# Patient Record
Sex: MALE | Race: WHITE
[De-identification: names, ages, dates, MRNs, and addresses within clinical notes are randomized per-mention and may not be internally consistent; named-entity substitution may affect disease eponyms.]

---

## 2018-09-12 ENCOUNTER — HOSPITAL ENCOUNTER (EMERGENCY)
Dept: HOSPITAL 74 - JERFT | Age: 43
Discharge: HOME | End: 2018-09-12
Payer: COMMERCIAL

## 2018-09-12 VITALS — DIASTOLIC BLOOD PRESSURE: 55 MMHG | HEART RATE: 81 BPM | TEMPERATURE: 99.6 F | SYSTOLIC BLOOD PRESSURE: 117 MMHG

## 2018-09-12 VITALS — BODY MASS INDEX: 26.6 KG/M2
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
## 2018-09-12 DIAGNOSIS — S90.111A: Primary | ICD-10-CM
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
## 2018-09-12 NOTE — PDOC
History of Present Illness





- General


Chief Complaint: Pain


Stated Complaint: RT TOE PAIN


Time Seen by Provider: 09/12/18 16:43





- History of Present Illness


Initial Comments: 





09/12/18 17:08


42-year-old male without comorbidities presents for evaluation of right great 

toe pain after banging into the floor while walking today. He points to the IPJ 

of the right great toe at the area of his discomfort.





Past History





- Past Medical History


Allergies/Adverse Reactions: 


 Allergies











Allergy/AdvReac Type Severity Reaction Status Date / Time


 


No Known Allergies Allergy   Verified 09/12/18 16:35











Home Medications: 


Ambulatory Orders





NK [No Known Home Medication]  09/12/18 








CVA: No


COPD: No





- Suicide/Smoking/Psychosocial Hx


Smoking History: Never smoked


Information on smoking cessation initiated: Yes


Hx Alcohol Use: No


Drug/Substance Use Hx: No


Substance Use Type: None





**Review of Systems





- Review of Systems


Musculoskeletal: Yes: See HPI, Joint Pain


All Other Systems: Reviewed and Negative





*Physical Exam





- Vital Signs


 Last Vital Signs











Temp Pulse Resp BP Pulse Ox


 


 99.6 F   81   18   117/55   100 


 


 09/12/18 16:33  09/12/18 16:33  09/12/18 16:33  09/12/18 16:33  09/12/18 16:33














- Physical Exam


Comments: 


Right great toe skin color and temperature are normal. Range of motion is 

limited at the IPJ. Tenderness at the IPJ. He has no gross sensorimotor 

deficits. He is neurovascularly intact.


09/12/18 17:09








Medical Decision Making





- Medical Decision Making





09/12/18 17:10


No acute fracture trauma destructive process in the right great toe on 

radiograph today





*DC/Admit/Observation/Transfer


Diagnosis at time of Disposition: 


Contusion of right great toe without damage to nail


Qualifiers:


 Encounter type: initial encounter Qualified Code(s): S90.111A - Contusion of 

right great toe without damage to nail, initial encounter








- Discharge Dispostion


Disposition: HOME


Condition at time of disposition: Stable


Decision to Admit order: No





- Referrals


Referrals: 


Edy Ko MD [Staff Physician] - 





- Patient Instructions


Additional Instructions: 


He may weight-bear as tolerated with an open toed shoe for comfort. Follow-up 

with orthopedic surgery in 2-3 days for further evaluation and treatment 

options. Return to the emergency room should symptoms worsen or go unresolved.





- Post Discharge Activity

## 2018-09-12 NOTE — PDOC
Rapid Medical Evaluation


Chief Complaint: Pain


Time Seen by Provider: 09/12/18 16:43


Medical Evaluation: 


 Allergies











Allergy/AdvReac Type Severity Reaction Status Date / Time


 


No Known Allergies Allergy   Verified 09/12/18 16:35








 Vital Signs











Temp Pulse Resp BP Pulse Ox


 


 99.6 F   81   18   117/55   100 


 


 09/12/18 16:33  09/12/18 16:33  09/12/18 16:33  09/12/18 16:33  09/12/18 16:33











09/12/18 16:44


I have performed a brief in person evaluation of this patient. 





The patient presents with chief complaint of :  right great toe pain after 

banding toe a a table today


Pertinent PE findings:  moderate tenderness over MTP and proximal phalange of 

right great toe


I have ordered the following: x-ray of right great toe





The patient will proceed to the ER for further evaluation


09/12/18 16:45








**Discharge Disposition





- Diagnosis


Contusion of right great toe without damage to nail


Qualifiers:


 Encounter type: initial encounter Qualified Code(s): S90.111A - Contusion of 

right great toe without damage to nail, initial encounter








- Referrals





- Patient Instructions





- Post Discharge Activity

## 2019-10-07 ENCOUNTER — HOSPITAL ENCOUNTER (EMERGENCY)
Dept: HOSPITAL 74 - JERFT | Age: 44
Discharge: HOME | End: 2019-10-07
Payer: COMMERCIAL

## 2019-10-07 VITALS — DIASTOLIC BLOOD PRESSURE: 69 MMHG | SYSTOLIC BLOOD PRESSURE: 118 MMHG | TEMPERATURE: 98 F | HEART RATE: 82 BPM

## 2019-10-07 VITALS — BODY MASS INDEX: 28.1 KG/M2
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
## 2019-10-07 DIAGNOSIS — S60.351A: Primary | ICD-10-CM
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
## 2019-10-07 PROCEDURE — 0HCQXZZ EXTIRPATION OF MATTER FROM FINGER NAIL, EXTERNAL APPROACH: ICD-10-PCS

## 2019-10-07 PROCEDURE — 0HCFXZZ EXTIRPATION OF MATTER FROM RIGHT HAND SKIN, EXTERNAL APPROACH: ICD-10-PCS

## 2019-10-07 NOTE — PDOC
History of Present Illness





- General


Chief Complaint: Pain


Stated Complaint: splinter on rt thumb, pain


Time Seen by Provider: 10/07/19 18:44


History Source: Patient


Exam Limitations: No Limitations





- History of Present Illness


Initial Comments: 





10/07/19 19:04


Chief complaint: Splinter to right thumb





Patient is a healthy 43-year-old male, tetanus up-to-date who states that he 

got a splinter under the nail to his right thumb from a dresser.  He tried to 

remove it, part of it broke off.  This happened today.





GENERAL/CONSTITUTIONAL: No fever, weakness. dizziness


HEAD, EYES, EARS, NOSE AND THROAT: No change in vision. No ear pain or 

discharge. No sore throat.


CARDIOVASCULAR: No chest pain


RESPIRATORY: No shortness of breath or cough


GASTROINTESTINAL: No pain, nausea, vomiting, diarrhea or constipation


GENITOURINARY: No dysuria


MUSCULOSKELETAL:  No neck or back pain


SKIN: No rash, + splinter right thumb


NEUROLOGIC: No headache, vertigo, loss of consciousness, or loss of sensation.








GENERAL: The patient is awake, alert, and fully oriented, in no acute distress.


HEAD: Normal with no signs of trauma.


EYES: Pupils equal, round and reactive to light, sclera anicteric, conjunctiva 

clear.


ENT: pharynx: no erythema, no exudate, uvula midline


NECK: supple


CHEST: clear, nontender, rr


EXTREMITIES: Normal range of motion, no edema.


NEUROLOGICAL: Normal speech, normal gait.


SKIN: Warm, Dry


Is this a multiple visit Asthma Patient?: No





Past History





- Past Medical History


Allergies/Adverse Reactions: 


 Allergies











Allergy/AdvReac Type Severity Reaction Status Date / Time


 


No Known Allergies Allergy   Verified 10/07/19 18:37











Home Medications: 


Ambulatory Orders





Amox-Tr/K Cl [Augmentin - 875Mg Tablet] 1 tab PO BID #14 tablet 10/07/19 








CVA: No


COPD: No





- Psycho Social/Smoking Cessation Hx


Smoking History: Never smoked


Information on smoking cessation initiated: No


Hx Alcohol Use: No


Drug/Substance Use Hx: No


Substance Use Type: None





*Physical Exam





- Vital Signs


 Last Vital Signs











Temp Pulse Resp BP Pulse Ox


 


 98 F   82   18   118/69   99 


 


 10/07/19 18:35  10/07/19 18:35  10/07/19 18:35  10/07/19 18:35  10/07/19 18:35














Procedures





- Additional Procedures


Additional Procedures: other (foreign body removal right thumb)


Progress: 





10/07/19 19:24


Digital block done, nail was partially removed overwear the splinter was and 

splinter was removed, there was splinter into the nailbed which was also 

removed.





Medical Decision Making





- Medical Decision Making





10/07/19 19:05


43-year-old male with splinter in the right thumb under the nail.  A digital 

block was done to prevent pain with removal of splinter and possible partial 

removal of nail.  After splinter was removed.  Patient will be placed on 

antibiotics





Discussed issues, findings, results, applicable medications and treatments and 

follow-up. All these were understood and all questions were answered





Discharge





- Discharge Information


Problems reviewed: Yes


Clinical Impression/Diagnosis: 


 Foreign body finger





Condition: Stable


Disposition: HOME





- Admission


No





- Additional Discharge Information


Prescriptions: 


Amox-Tr/K Cl [Augmentin - 875Mg Tablet] 1 tab PO BID #14 tablet





- Follow up/Referral


Referrals: 


Bigg Recinos MD [Staff Physician] - 





- Patient Discharge Instructions


Patient Printed Discharge Instructions:  DI for Splinter Removal


Additional Instructions: 


Soak in soapy warm water 2-3 times daily.





Clean with soap and water 2-3 times daily, apply bacitracin





Take Augmentin as prescribed until it is finished even if finger is better





Have her reevaluated if redness, pus, fever or getting worse





Followup with hand surgeon if getting worse





- Post Discharge Activity